# Patient Record
Sex: FEMALE | Race: WHITE | URBAN - METROPOLITAN AREA
[De-identification: names, ages, dates, MRNs, and addresses within clinical notes are randomized per-mention and may not be internally consistent; named-entity substitution may affect disease eponyms.]

---

## 2021-10-27 ENCOUNTER — NEW PATIENT COMPREHENSIVE (OUTPATIENT)
Dept: URBAN - METROPOLITAN AREA CLINIC 50 | Facility: CLINIC | Age: 81
End: 2021-10-27

## 2021-10-27 DIAGNOSIS — H25.813: ICD-10-CM

## 2021-10-27 DIAGNOSIS — H00.13: ICD-10-CM

## 2021-10-27 DIAGNOSIS — H02.834: ICD-10-CM

## 2021-10-27 DIAGNOSIS — H16.223: ICD-10-CM

## 2021-10-27 DIAGNOSIS — H02.403: ICD-10-CM

## 2021-10-27 DIAGNOSIS — H02.831: ICD-10-CM

## 2021-10-27 PROCEDURE — 92004 COMPRE OPH EXAM NEW PT 1/>: CPT

## 2021-10-27 RX ORDER — TOBRAMYCIN AND DEXAMETHASONE 1; 3 MG/ML; MG/ML: 1 SUSPENSION/ DROPS OPHTHALMIC

## 2021-10-27 ASSESSMENT — VISUAL ACUITY
OS_SC: 20/200
OS_GLARE: 20/70
OU_SC: 20/60
OD_GLARE: 20/80
OU_CC: 20/25
OD_CC: 20/30-1
OD_SC: 20/70-1
OU_CC: J1
OS_GLARE: 20/40
OD_GLARE: 20/40
OS_CC: 20/25-1

## 2021-10-27 ASSESSMENT — TONOMETRY
OS_IOP_MMHG: 16
OD_IOP_MMHG: 16

## 2021-10-27 NOTE — PATIENT DISCUSSION
Recommend artificial tears 4 times daily in both eyes for best vision and comfort. Brands such as Refresh, Thera Tears, and Systane are good options. Avoid redness away drops such as Visine.

## 2021-11-03 ENCOUNTER — 1 WEEK FOLLOW-UP (OUTPATIENT)
Dept: URBAN - METROPOLITAN AREA CLINIC 50 | Facility: CLINIC | Age: 81
End: 2021-11-03

## 2021-11-03 DIAGNOSIS — H00.13: ICD-10-CM

## 2021-11-03 PROCEDURE — 92012 INTRM OPH EXAM EST PATIENT: CPT

## 2021-11-03 ASSESSMENT — VISUAL ACUITY
OD_PH: 20/30
OU_CC: 20/25
OS_CC: 20/25
OD_CC: 20/50

## 2021-11-03 ASSESSMENT — TONOMETRY
OS_IOP_MMHG: 15
OD_IOP_MMHG: 16

## 2021-12-29 ENCOUNTER — FOLLOW UP (OUTPATIENT)
Dept: URBAN - METROPOLITAN AREA CLINIC 50 | Facility: CLINIC | Age: 81
End: 2021-12-29

## 2021-12-29 DIAGNOSIS — H00.13: ICD-10-CM

## 2021-12-29 DIAGNOSIS — H16.223: ICD-10-CM

## 2021-12-29 PROCEDURE — 92012 INTRM OPH EXAM EST PATIENT: CPT

## 2021-12-29 ASSESSMENT — VISUAL ACUITY
OD_CC: 20/50
OD_PH: 20/40
OS_CC: 20/30

## 2021-12-29 ASSESSMENT — TONOMETRY
OS_IOP_MMHG: 19
OD_IOP_MMHG: 19

## 2021-12-29 NOTE — PATIENT DISCUSSION
New RLL x2  Chalazion. Continue Tobradex QID for 7 days along with warm compresses 3x day with oasis [employer] Keflex 500 mg po tid x 7 days.  F/u 2 weeks.

## 2022-01-12 ENCOUNTER — FOLLOW UP (OUTPATIENT)
Dept: URBAN - METROPOLITAN AREA CLINIC 50 | Facility: CLINIC | Age: 82
End: 2022-01-12

## 2022-01-12 DIAGNOSIS — H25.813: ICD-10-CM

## 2022-01-12 DIAGNOSIS — L72.3: ICD-10-CM

## 2022-01-12 DIAGNOSIS — H00.13: ICD-10-CM

## 2022-01-12 DIAGNOSIS — H16.223: ICD-10-CM

## 2022-01-12 PROCEDURE — 92012 INTRM OPH EXAM EST PATIENT: CPT

## 2022-01-12 ASSESSMENT — TONOMETRY
OS_IOP_MMHG: 19
OD_IOP_MMHG: 24
OS_IOP_MMHG: 22
OD_IOP_MMHG: 23

## 2022-01-12 ASSESSMENT — VISUAL ACUITY
OU_CC: 20/25+2
OS_CC: 20/25-1
OD_CC: 20/40-2

## 2022-01-12 NOTE — PATIENT DISCUSSION
Discussed mono vision with patient and the option of distance vision. Looking back at old charts, right eye was set for distance and left eye was set for near.

## 2022-01-12 NOTE — PATIENT DISCUSSION
Discussed with patient that the cyst will not go away on its own. Discussed we can refer her to Dr. Janie Flores for excision. He reassured her that the cyst would not cause complications during cataract surgery.